# Patient Record
Sex: MALE | Race: WHITE
[De-identification: names, ages, dates, MRNs, and addresses within clinical notes are randomized per-mention and may not be internally consistent; named-entity substitution may affect disease eponyms.]

---

## 2021-06-26 ENCOUNTER — HOSPITAL ENCOUNTER (EMERGENCY)
Dept: HOSPITAL 53 - M ED | Age: 26
Discharge: HOME | End: 2021-06-26
Payer: COMMERCIAL

## 2021-06-26 VITALS — SYSTOLIC BLOOD PRESSURE: 129 MMHG | DIASTOLIC BLOOD PRESSURE: 81 MMHG

## 2021-06-26 VITALS — HEIGHT: 74 IN | WEIGHT: 270.73 LBS | BODY MASS INDEX: 34.74 KG/M2
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
## 2021-06-26 DIAGNOSIS — S16.1XXA: Primary | ICD-10-CM

## 2021-06-26 DIAGNOSIS — F17.210: ICD-10-CM
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
